# Patient Record
Sex: MALE | Race: WHITE | Employment: FULL TIME | URBAN - NONMETROPOLITAN AREA
[De-identification: names, ages, dates, MRNs, and addresses within clinical notes are randomized per-mention and may not be internally consistent; named-entity substitution may affect disease eponyms.]

---

## 2021-04-29 ENCOUNTER — OFFICE VISIT (OUTPATIENT)
Dept: PRIMARY CARE CLINIC | Age: 46
End: 2021-04-29

## 2021-04-29 VITALS
SYSTOLIC BLOOD PRESSURE: 128 MMHG | OXYGEN SATURATION: 98 % | DIASTOLIC BLOOD PRESSURE: 86 MMHG | BODY MASS INDEX: 36.4 KG/M2 | TEMPERATURE: 97.6 F | RESPIRATION RATE: 18 BRPM | WEIGHT: 260 LBS | HEART RATE: 76 BPM | HEIGHT: 71 IN
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
## 2021-04-29 DIAGNOSIS — M25.511 ACUTE PAIN OF RIGHT SHOULDER: ICD-10-CM

## 2021-04-29 RX ORDER — DIVALPROEX SODIUM 250 MG/1
TABLET, DELAYED RELEASE ORAL
COMMUNITY
Start: 2021-04-15

## 2021-04-29 RX ORDER — LAMOTRIGINE 100 MG/1
TABLET ORAL
COMMUNITY
Start: 2021-04-14

## 2021-04-29 ASSESSMENT — ENCOUNTER SYMPTOMS
CHANGE IN BOWEL HABIT: 0
VISUAL CHANGE: 0
SORE THROAT: 0
SWOLLEN GLANDS: 0
CHEST TIGHTNESS: 0
ABDOMINAL PAIN: 0
EYE PAIN: 0
SHORTNESS OF BREATH: 0
NAUSEA: 0
COUGH: 0
PHOTOPHOBIA: 0
VOMITING: 0

## 2021-04-29 NOTE — PROGRESS NOTES
[place]  [facility] [address] [facility]  Dept: [phone]  Loc: [address] Dion Cerrato (:  1975) is a 42 y.o. male,New patient, here for evaluation of the following chief complaint(s):  Dizziness (slipped on metal and fell and hit his right arm. states he got dizzy after falling due to the pain and states he feels like he overheated. )      ASSESSMENT/PLAN:  1. Fall, initial encounter  2. Acute pain of right shoulder  slip and fall with contusion by impact to right upper extremity at the shoulder level when striking the concerete floor  This was witness in the work area. Today declines reporting as a Suzanne Ao since it occurred at work, he was agreeable to check in as a  first aid evaluation and aware that if symptoms progressed or do not improve that he can always come down and initiate the Suzanne Ao in systoc    No loc or sing of neuro involvement  Denied diabetes or any other ill symptoms before work today  Stated that anytime he has an acute injury that he reacts this way and it will resolve if he lays down. VSS  States that after he fell the pain was sharp and rated 10/10 in the shoulder and that he felt like he may have grunted and held his breath because of the pain and this is why he became dizzy, sweaty and pale. After resting on the medical clinic bed for approximately 30 minutes and the color returned to his face and was no longer shaking and diaphoretic. From examination of the right upper extremity there are no acute sings of fracture, abrasion, swelling or neurovascular compromise noted. Due to the injury and the contusion from fall he is aware that his arm may be more stiff and sore, become more sore or painful. Ice was applied and he was given 600 mg ibuprofen in the clinic for discomfort.     Treatment recommended:  RICE conservative management   disscused stretching and follow up if any symptoms. Perform AROM and follow up if any further issues or worsening symptoms of the Right upper arm. PMH reviewed he does take seizure medications denies seizure. activity. AROM is decreased to the Right shoulder as documented. Decreased ability to perform >90 degrees shoulder adduction plan due to soreness he reports. He denies any neck stiffness or right trapezoid pain or bony tenderness of the cervical spine. No headache or visual issues reported. No signs of contusion or abrasion to the head and he denies striking head on the concrete floor  when he fell. Neck ROM WNL  Pain upon discharge 5/10, aching and stiffness reported. Follow up PRN   No follow-ups on file. SUBJECTIVE/OBJECTIVE:  Brayden Bowers walks into the clinic by himself and appears pale and diaphoretic. He asked to lay down states he just slipped on a piece of metal and fell onto his right shoulder about 10-15  minutes prior to arrival. He states after sitting for about 10-15 minutes fter the initial fall out on the assembly floor that he felt dizzy and his supervisor told him he was sweaty and pale to come to the clinic. He reports initially felt severe pain in the right shoulder but is lessening. States the pain is sore in the joint but more in the deltoid muscle region which is where he felt the impact of the fall. The arm ws not outstretched. He denies LOC, hitting head or any ill symptoms prior to the fall. He states he ate some oranges for break, denies hypoglycemia    No chest pain, visual changes, numbness or tingling, no headache, head pain or shortness of breath    Initially just wanted to lay down however; with presentation asked if we could check him in to perform an assessment as he ws pale and sweaty upon arrival with mild tremor to hands. Dizziness  This is a new problem. The current episode started today. The problem has been gradually improving.  Associated symptoms include arthralgias, Abdominal:      Palpations: Abdomen is soft. Musculoskeletal:         General: Tenderness and signs of injury present. No swelling or deformity. Right shoulder: He exhibits decreased range of motion, tenderness, bony tenderness and pain. He exhibits no swelling, no effusion, no crepitus, no deformity, no laceration, no spasm, normal pulse and normal strength. Right lower leg: No edema. Left lower leg: No edema. Comments: Pain is a soreness aching reported with tenderness in the muscles of the right upper shoulder   Tequila Denton reports soreness is more prominent in the deltoid region  AROM:Right upper extremity  Forward Flexion: 130  Extension 40  Adduction 110  Abduction 90  External rotation 90    No locking clicking or popping felt in the shoulder with PROM    Clavicle WNL  Tenderness at humeral head  Neurovascular intact  Sensation intact   strong  Good refill     Skin:     General: Skin is cool and moist.      Capillary Refill: Capillary refill takes less than 2 seconds. Coloration: Skin is pale. Skin is not mottled. Findings: No abrasion, erythema, signs of injury or laceration. Comments: Diaphoretic generalized   Neurological:      Mental Status: He is alert and oriented to person, place, and time. Sensory: No sensory deficit. Motor: No weakness. Psychiatric:         Behavior: Behavior normal.               Additional Information:    /86   Pulse 76   Temp 97.6 °F (36.4 °C)   Resp 18   Ht 5' 11\" (1.803 m)   Wt 260 lb (117.9 kg)   SpO2 98%   BMI 36.26 kg/m²     An electronic signature was used to authenticate this note.     --LEN Broussard - CNP

## 2021-04-29 NOTE — PATIENT INSTRUCTIONS
Patient Education        Joint Pain: Care Instructions  Your Care Instructions     Many people have small aches and pains from overuse or injury to muscles and joints. Joint injuries often happen during sports or recreation, work tasks, or projects around the home. An overuse injury can happen when you put too much stress on a joint or when you do an activity that stresses the joint over and over, such as using the computer or rowing a boat. You can take action at home to help your muscles and joints get better. You should feel better in 1 to 2 weeks, but it can take 3 months or more to heal completely. Follow-up care is a key part of your treatment and safety. Be sure to make and go to all appointments, and call your doctor if you are having problems. It's also a good idea to know your test results and keep a list of the medicines you take. How can you care for yourself at home? · Do not put weight on the injured joint for at least a day or two. · For the first day or two after an injury, do not take hot showers or baths, and do not use hot packs. The heat could make swelling worse. · Put ice or a cold pack on the sore joint for 10 to 20 minutes at a time. Try to do this every 1 to 2 hours for the next 3 days (when you are awake) or until the swelling goes down. Put a thin cloth between the ice and your skin. · Wrap the injury in an elastic bandage. Do not wrap it too tightly because this can cause more swelling. · Prop up the sore joint on a pillow when you ice it or anytime you sit or lie down during the next 3 days. Try to keep it above the level of your heart. This will help reduce swelling. · Take an over-the-counter pain medicine, such as acetaminophen (Tylenol), ibuprofen (Advil, Motrin), or naproxen (Aleve). Read and follow all instructions on the label. · After 1 or 2 days of rest, begin moving the joint gently.  While the joint is still healing, you can begin to exercise using activities that do not strain or hurt the painful joint. When should you call for help? Call your doctor now or seek immediate medical care if:    · You have signs of infection, such as:  ? Increased pain, swelling, warmth, and redness. ? Red streaks leading from the joint. ? A fever. Watch closely for changes in your health, and be sure to contact your doctor if:    · Your movement or symptoms are not getting better after 1 to 2 weeks of home treatment. Where can you learn more? Go to [email or web address]://[email or web address]. org and sign in to your [employer] account. Enter P205 in the Setup box to learn more about \"Joint Pain: Care Instructions. \"     If you do not have an account, please click on the \"Sign Up Now\" link. Current as of: November 16, 2020               Content Version: 12.8  © 9164-1021 [employer]. Care instructions adapted under license by [facility] ([facility]). If you have questions about a medical condition or this instruction, always ask your healthcare professional. [address] any warranty or liability for your use of this information.